# Patient Record
Sex: MALE | Race: WHITE | NOT HISPANIC OR LATINO | Employment: UNEMPLOYED | URBAN - METROPOLITAN AREA
[De-identification: names, ages, dates, MRNs, and addresses within clinical notes are randomized per-mention and may not be internally consistent; named-entity substitution may affect disease eponyms.]

---

## 2018-10-28 ENCOUNTER — HOSPITAL ENCOUNTER (EMERGENCY)
Facility: OTHER | Age: 33
Discharge: LEFT BEFORE DISCHARGE | End: 2018-10-29
Attending: EMERGENCY MEDICINE
Payer: MEDICAID

## 2018-10-28 DIAGNOSIS — R07.9 CHEST PAIN: ICD-10-CM

## 2018-10-28 DIAGNOSIS — F19.90 IVDU (INTRAVENOUS DRUG USER): ICD-10-CM

## 2018-10-28 DIAGNOSIS — L03.113 CELLULITIS OF RIGHT UPPER EXTREMITY: Primary | ICD-10-CM

## 2018-10-28 LAB
ANION GAP SERPL CALC-SCNC: 11 MMOL/L
BASOPHILS # BLD AUTO: 0.06 K/UL
BASOPHILS NFR BLD: 0.4 %
BUN SERPL-MCNC: 17 MG/DL
CALCIUM SERPL-MCNC: 9 MG/DL
CHLORIDE SERPL-SCNC: 102 MMOL/L
CO2 SERPL-SCNC: 26 MMOL/L
CREAT SERPL-MCNC: 0.9 MG/DL
D DIMER PPP IA.FEU-MCNC: 0.76 MG/L FEU
DIFFERENTIAL METHOD: ABNORMAL
EOSINOPHIL # BLD AUTO: 0.4 K/UL
EOSINOPHIL NFR BLD: 2.4 %
ERYTHROCYTE [DISTWIDTH] IN BLOOD BY AUTOMATED COUNT: 13 %
EST. GFR  (AFRICAN AMERICAN): >60 ML/MIN/1.73 M^2
EST. GFR  (NON AFRICAN AMERICAN): >60 ML/MIN/1.73 M^2
GLUCOSE SERPL-MCNC: 111 MG/DL
HCT VFR BLD AUTO: 38.8 %
HGB BLD-MCNC: 13.1 G/DL
LYMPHOCYTES # BLD AUTO: 3.9 K/UL
LYMPHOCYTES NFR BLD: 25 %
MCH RBC QN AUTO: 30.3 PG
MCHC RBC AUTO-ENTMCNC: 33.8 G/DL
MCV RBC AUTO: 90 FL
MONOCYTES # BLD AUTO: 1.6 K/UL
MONOCYTES NFR BLD: 10 %
NEUTROPHILS # BLD AUTO: 9.6 K/UL
NEUTROPHILS NFR BLD: 61.9 %
PLATELET # BLD AUTO: 300 K/UL
PMV BLD AUTO: 8.9 FL
POTASSIUM SERPL-SCNC: 3.8 MMOL/L
RBC # BLD AUTO: 4.32 M/UL
SODIUM SERPL-SCNC: 139 MMOL/L
TROPONIN I SERPL DL<=0.01 NG/ML-MCNC: <0.006 NG/ML
WBC # BLD AUTO: 15.56 K/UL

## 2018-10-28 PROCEDURE — 80048 BASIC METABOLIC PNL TOTAL CA: CPT

## 2018-10-28 PROCEDURE — 93010 ELECTROCARDIOGRAM REPORT: CPT | Mod: ,,, | Performed by: INTERNAL MEDICINE

## 2018-10-28 PROCEDURE — 10060 I&D ABSCESS SIMPLE/SINGLE: CPT

## 2018-10-28 PROCEDURE — 25000003 PHARM REV CODE 250: Performed by: EMERGENCY MEDICINE

## 2018-10-28 PROCEDURE — 99285 EMERGENCY DEPT VISIT HI MDM: CPT | Mod: 25

## 2018-10-28 PROCEDURE — 96366 THER/PROPH/DIAG IV INF ADDON: CPT

## 2018-10-28 PROCEDURE — 93005 ELECTROCARDIOGRAM TRACING: CPT

## 2018-10-28 PROCEDURE — 87040 BLOOD CULTURE FOR BACTERIA: CPT | Mod: 59

## 2018-10-28 PROCEDURE — S0077 INJECTION, CLINDAMYCIN PHOSP: HCPCS | Performed by: EMERGENCY MEDICINE

## 2018-10-28 PROCEDURE — 25500020 PHARM REV CODE 255: Performed by: EMERGENCY MEDICINE

## 2018-10-28 PROCEDURE — 85025 COMPLETE CBC W/AUTO DIFF WBC: CPT

## 2018-10-28 PROCEDURE — 85379 FIBRIN DEGRADATION QUANT: CPT

## 2018-10-28 PROCEDURE — 96365 THER/PROPH/DIAG IV INF INIT: CPT | Mod: 59

## 2018-10-28 PROCEDURE — 84484 ASSAY OF TROPONIN QUANT: CPT

## 2018-10-28 RX ORDER — CLINDAMYCIN PHOSPHATE 900 MG/50ML
900 INJECTION, SOLUTION INTRAVENOUS ONCE
Status: COMPLETED | OUTPATIENT
Start: 2018-10-28 | End: 2018-10-29

## 2018-10-28 RX ORDER — CLINDAMYCIN PHOSPHATE 150 MG/ML
900 INJECTION, SOLUTION INTRAVENOUS
Status: DISCONTINUED | OUTPATIENT
Start: 2018-10-28 | End: 2018-10-28 | Stop reason: CLARIF

## 2018-10-28 RX ADMIN — CLINDAMYCIN IN 5 PERCENT DEXTROSE 900 MG: 18 INJECTION, SOLUTION INTRAVENOUS at 10:10

## 2018-10-28 RX ADMIN — IOHEXOL 75 ML: 350 INJECTION, SOLUTION INTRAVENOUS at 11:10

## 2018-10-29 VITALS
WEIGHT: 170 LBS | SYSTOLIC BLOOD PRESSURE: 128 MMHG | RESPIRATION RATE: 18 BRPM | OXYGEN SATURATION: 100 % | DIASTOLIC BLOOD PRESSURE: 70 MMHG | BODY MASS INDEX: 23.03 KG/M2 | HEIGHT: 72 IN | TEMPERATURE: 98 F | HEART RATE: 89 BPM

## 2018-10-29 PROCEDURE — 10060 I&D ABSCESS SIMPLE/SINGLE: CPT

## 2018-10-29 RX ORDER — CLINDAMYCIN HYDROCHLORIDE 150 MG/1
450 CAPSULE ORAL EVERY 8 HOURS
Qty: 56 CAPSULE | Refills: 0 | Status: SHIPPED | OUTPATIENT
Start: 2018-10-29 | End: 2018-11-05

## 2018-10-29 NOTE — ED TRIAGE NOTES
Pt reports abscess to right elbow x 4 days. Pt reports 5 out of 10 pain to area. Pt is AAO x 3, answers questions appropriately

## 2018-10-29 NOTE — ED PROVIDER NOTES
Encounter Date: 10/28/2018       History     Chief Complaint   Patient presents with    Abscess     Abscess to right elbow x 4 days     Patient is a 32-year-old male with history IVDU (heroin and recently discharged from [facility] 5 days ago) who presents to the ED with an abscess.  Patient reports noticing a small, red bump to his right elbow 4 days ago.  He states he attempted to drain the area later that day and was unsuccessful.  He states his progressively enlarged and became more painful.  Patient states he drain the abscess last night and got a significant amount of purulent drainage. He reports pain and redness is still present.  He is able to range his elbow completely.  He reports low-grade fever yesterday of 99 °.  Patient also states he has had intermittent, midsternal chest pain that is pleuritic in nature.  He states he experiences once again on the ride over here to the ED and is currently still experiencing some discomfort.      The history is provided by the patient.     Review of patient's allergies indicates:  No Known Allergies  Past Medical History:   Diagnosis Date    Anxiety     Depression      History reviewed. No pertinent surgical history.  History reviewed. No pertinent family history.  Social History     Tobacco Use    Smoking status: Current Every Day Smoker   Substance Use Topics    Alcohol use: Yes    Drug use: Yes     Review of Systems   Constitutional: Negative for chills and fatigue.   HENT: Negative for congestion and sore throat.    Eyes: Negative for pain.   Respiratory: Negative for shortness of breath.    Cardiovascular: Positive for chest pain.   Gastrointestinal: Negative for abdominal pain, diarrhea, nausea and vomiting.   Genitourinary: Negative for dysuria.   Musculoskeletal: Negative for arthralgias.   Skin:        Abscess, and surrounding redness to right elbow   Neurological: Negative for headaches.       Physical Exam     Initial Vitals [10/28/18 2055]   BP  Pulse Resp Temp SpO2   135/74 93 18 97.9 °F (36.6 °C) 100 %      MAP       --         Physical Exam    Constitutional: Vital signs are normal. He is cooperative.   HENT:   Head: Normocephalic and atraumatic.   Eyes: EOM are normal. Pupils are equal, round, and reactive to light.   Neck: Normal range of motion. Neck supple.   Cardiovascular: Normal rate, regular rhythm, normal heart sounds and intact distal pulses.   No murmur heard.  Pulmonary/Chest: Breath sounds normal. He has no wheezes. He has no rhonchi. He has no rales.   Abdominal: Soft. Bowel sounds are normal. There is no tenderness.   Musculoskeletal: Normal range of motion. He exhibits no edema.   Neurological: He is alert and oriented to person, place, and time. GCS eye subscore is 4. GCS verbal subscore is 5. GCS motor subscore is 6.   Skin: Skin is warm and dry. Capillary refill takes less than 2 seconds. No rash noted.   Erythematous, well-demarcated 4 x 3 cm area to the lateral aspect of the right elbow.  No fluctuance or induration.  There is a scab on the medial margin with a small area of surrounding skin desquamation.  No drainage. Bandage removed revealed malodorous, green tinged drainage.   Psychiatric: He has a normal mood and affect. His behavior is normal.         ED Course   I & D - Incision and Drainage  Date/Time: 10/29/2018 12:48 AM  Location procedure was performed: [facility], [place], operated by [facility] EMERGENCY DEPARTMENT  Performed by: Oscar Wesley PA-C  Authorized by: Kari Moran MD   Type: abscess  Body area: upper extremity  Location details: right elbow  Anesthesia: local infiltration    Anesthesia:  Local Anesthetic: lidocaine 1% without epinephrine  Anesthetic total: 2 mL  Scalpel size: 11  Incision type: single straight  Complexity: simple  Drainage: bloody  Drainage amount: scant  Wound treatment: incision and  expression of material  Packing material: none  Patient tolerance: Patient tolerated the procedure well with no immediate  complications        Labs Reviewed   CBC W/ AUTO DIFFERENTIAL - Abnormal; Notable for the following components:       Result Value    WBC 15.56 (*)     RBC 4.32 (*)     Hemoglobin 13.1 (*)     Hematocrit 38.8 (*)     MPV 8.9 (*)     Gran # (ANC) 9.6 (*)     Mono # 1.6 (*)     All other components within normal limits   BASIC METABOLIC PANEL - Abnormal; Notable for the following components:    Glucose 111 (*)     All other components within normal limits   D DIMER, QUANTITATIVE - Abnormal; Notable for the following components:    D-Dimer 0.76 (*)     All other components within normal limits   CULTURE, BLOOD   CULTURE, BLOOD   TROPONIN I     EKG Readings: (Independently Interpreted)   Normal sinus rhythm at a rate of 82 beats per minute. No STEMI.  T-wave inversion noted to lead III and V3.  Possible left, posterior fascicular block.  No previous EKG to compare to.       Imaging Results          CTA Chest Non-Coronary (PE Study) (Final result)  Result time 10/28/18 23:34:28    Final result by Pavel Johnson MD (10/28/18 23:34:28)                 Impression:      No acute pulmonary thromboemboli.        Electronically signed by: Pavel Johnson MD  Date:    10/28/2018  Time:    23:34             Narrative:    EXAMINATION:  CTA CHEST NON CORONARY    CLINICAL HISTORY:  Chest pain, acute, PE suspected, intermed prob, positive D-dimer;    TECHNIQUE:  Low dose axial images, sagittal and coronal reformations were obtained from the thoracic inlet to the lung bases following the IV administration of 75 mL of Omnipaque 350.  Contrast timing was optimized to evaluate the pulmonary arteries.  MIP images were performed.    COMPARISON:  None    FINDINGS:    Good contrast bolus opacification of the pulmonary arteries. No acute pulmonary thromboembolism. No hilar or mediastinal mass or lymphadenopathy. No pleural or pericardial effusion. Lungs are well-expanded and clear with no consolidation, pulmonary nodule or pneumothorax.  Mild linear parenchymal scarring in the right middle lobe and lingula.  Limited images through the upper abdomen are unremarkable.                               X-Ray Chest PA And Lateral (Final result)  Result time 10/28/18 22:08:17    Final result by Linden Chavez MD (10/28/18 22:08:17)                 Impression:      No acute intrathoracic process identified.      Electronically signed by: Linden Chavez MD  Date:    10/28/2018  Time:    22:08             Narrative:    EXAMINATION:  XR CHEST PA AND LATERAL    CLINICAL HISTORY:  Chest pain, unspecified    TECHNIQUE:  PA and lateral views of the chest were performed.    COMPARISON:  None    FINDINGS:  Cardiac silhouette is normal in size.  Lungs are symmetrically expanded.  No evidence of focal consolidative process, pneumothorax, or significant effusion.  No acute osseous abnormality identified.                                 Medical Decision Making:   Initial Assessment:   Urgent evaluation of a 32 y.o. male with IV drug use presenting to the emergency department complaining of abscess and chest pain. Patient is afebrile, nontoxic appearing and hemodynamically stable.  Low suspicion for endocarditis (positive for 1 minor [facility] criteria).  Patient appears well. No discernible abscess to drain but there is cellulitis to the right elbow.  Will attempt drainage as patient is requesting this.  Will obtain labs and further workup of patient's chest pain. IV clindamycin will be administered as well.  ED Management:  Lab work reveals leukocytosis of 15.56.  Anemia is present with H&H of 13.1 and 38.8.  Troponin is less than 0.006.  D-dimer is elevated at 0.76 will obtain CTA.  Incision and drainage was attempted but only blood was expressed.  Chest x-ray revealed no acute intrathoracic process.  CTA reveals no acute pulmonary thromboemboli.  Have advised that patient be admitted to the hospital for IV antibiotics and possible further workup for endocarditis as he is  a high-risk patient.  Patient does not want to stay.  He signed AMA forms and was educated he can return at any time.  He is amenable to this plan.  He is given information to follow up with [facility].  He will be sent home with clindamycin.  He was stable upon leaving the ED.  Other:   I have discussed this case with another health care provider.  This note was created using Dragon Medical Dictation. There may be typographical errors secondary to dictation.                       Clinical Impression:     1. Cellulitis of right upper extremity    2. Chest pain    3. IVDU (intravenous drug user)                               Oscar Wesley PA-C  10/29/18 0056

## 2018-10-29 NOTE — ED NOTES
Pt expressed to provider on the need to leave, pt instructed on the need for AMA paperwork to be filled out. Pt signs form at this time

## 2018-11-03 LAB
BACTERIA BLD CULT: NORMAL
BACTERIA BLD CULT: NORMAL